# Patient Record
Sex: FEMALE | Race: WHITE | URBAN - METROPOLITAN AREA
[De-identification: names, ages, dates, MRNs, and addresses within clinical notes are randomized per-mention and may not be internally consistent; named-entity substitution may affect disease eponyms.]

---

## 2024-02-09 ENCOUNTER — APPOINTMENT (RX ONLY)
Dept: URBAN - METROPOLITAN AREA CLINIC 41 | Facility: CLINIC | Age: 34
Setting detail: DERMATOLOGY
End: 2024-02-09

## 2024-02-09 DIAGNOSIS — D22 MELANOCYTIC NEVI: ICD-10-CM | Status: STABLE

## 2024-02-09 DIAGNOSIS — Z87.2 PERSONAL HISTORY OF DISEASES OF THE SKIN AND SUBCUTANEOUS TISSUE: ICD-10-CM | Status: STABLE

## 2024-02-09 DIAGNOSIS — L57.8 OTHER SKIN CHANGES DUE TO CHRONIC EXPOSURE TO NONIONIZING RADIATION: ICD-10-CM | Status: STABLE

## 2024-02-09 DIAGNOSIS — Z41.9 ENCOUNTER FOR PROCEDURE FOR PURPOSES OTHER THAN REMEDYING HEALTH STATE, UNSPECIFIED: ICD-10-CM

## 2024-02-09 DIAGNOSIS — L82.1 OTHER SEBORRHEIC KERATOSIS: ICD-10-CM | Status: STABLE

## 2024-02-09 DIAGNOSIS — B07.8 OTHER VIRAL WARTS: ICD-10-CM | Status: INADEQUATELY CONTROLLED

## 2024-02-09 DIAGNOSIS — D18.0 HEMANGIOMA: ICD-10-CM | Status: STABLE

## 2024-02-09 PROBLEM — D18.01 HEMANGIOMA OF SKIN AND SUBCUTANEOUS TISSUE: Status: ACTIVE | Noted: 2024-02-09

## 2024-02-09 PROBLEM — D22.4 MELANOCYTIC NEVI OF SCALP AND NECK: Status: ACTIVE | Noted: 2024-02-09

## 2024-02-09 PROBLEM — D22.5 MELANOCYTIC NEVI OF TRUNK: Status: ACTIVE | Noted: 2024-02-09

## 2024-02-09 PROCEDURE — ? COSMETIC CONSULTATION: MICRONEEDLING

## 2024-02-09 PROCEDURE — ? COSMETIC CONSULTATION: PRODUCTS

## 2024-02-09 PROCEDURE — ? COSMETIC CONSULTATION: FACIAL

## 2024-02-09 PROCEDURE — ? FULL BODY SKIN EXAM

## 2024-02-09 PROCEDURE — ? COSMETIC CONSULTATION: GENERAL

## 2024-02-09 PROCEDURE — ? COUNSELING

## 2024-02-09 PROCEDURE — ? TREATMENT REGIMEN

## 2024-02-09 PROCEDURE — 99203 OFFICE O/P NEW LOW 30 MIN: CPT

## 2024-02-09 PROCEDURE — ? PRESCRIPTION

## 2024-02-09 RX ORDER — MOMETASONE FUROATE 1 MG/G
CREAM TOPICAL
Qty: 15 | Refills: 0 | Status: ERX | COMMUNITY
Start: 2024-02-09

## 2024-02-09 RX ADMIN — MOMETASONE FUROATE: 1 CREAM TOPICAL at 00:00

## 2024-02-09 ASSESSMENT — LOCATION DETAILED DESCRIPTION DERM
LOCATION DETAILED: RIGHT POSTERIOR SHOULDER
LOCATION DETAILED: LEFT POSTERIOR SHOULDER
LOCATION DETAILED: LEFT ANTERIOR PROXIMAL THIGH
LOCATION DETAILED: INFERIOR MID FOREHEAD
LOCATION DETAILED: RIGHT CENTRAL FRONTAL SCALP
LOCATION DETAILED: LEFT INFERIOR MEDIAL MALAR CHEEK
LOCATION DETAILED: LEFT INFERIOR MEDIAL MIDBACK
LOCATION DETAILED: LEFT INFERIOR CENTRAL MALAR CHEEK
LOCATION DETAILED: LEFT PLANTAR FOREFOOT OVERLYING 2ND METATARSAL
LOCATION DETAILED: INFERIOR THORACIC SPINE
LOCATION DETAILED: RIGHT INFERIOR CENTRAL MALAR CHEEK

## 2024-02-09 ASSESSMENT — LOCATION SIMPLE DESCRIPTION DERM
LOCATION SIMPLE: RIGHT CHEEK
LOCATION SIMPLE: RIGHT SHOULDER
LOCATION SIMPLE: LEFT THIGH
LOCATION SIMPLE: LEFT SHOULDER
LOCATION SIMPLE: UPPER BACK
LOCATION SIMPLE: LEFT LOWER BACK
LOCATION SIMPLE: RIGHT SCALP
LOCATION SIMPLE: LEFT CHEEK
LOCATION SIMPLE: INFERIOR FOREHEAD
LOCATION SIMPLE: LEFT PLANTAR SURFACE

## 2024-02-09 ASSESSMENT — LOCATION ZONE DERM
LOCATION ZONE: TRUNK
LOCATION ZONE: FACE
LOCATION ZONE: ARM
LOCATION ZONE: FACE
LOCATION ZONE: LEG
LOCATION ZONE: FEET
LOCATION ZONE: SCALP

## 2024-02-09 NOTE — PROCEDURE: COUNSELING
Detail Level: Generalized
Detail Level: Simple
Detail Level: Zone
Patient Specific Counseling (Will Not Stick From Patient To Patient): ==\\nPt admits the mole is bothersome and irritating. AF counsels pt the mole can be shaved down and it is likely pt would not lose all hair in the area as the hair follicle goes past the mole.
Detail Level: Detailed
Patient Specific Counseling (Will Not Stick From Patient To Patient): ==\\nPt states she had a spitz nevus removed within the last few years.
Patient Specific Counseling (Will Not Stick From Patient To Patient): ==\\nPt notes she has been seeing a podiatrist for treatment of multiple warts on her foot.

## 2024-02-09 NOTE — HPI: EVALUATION OF SKIN LESION(S)
Hpi Title: Evaluation of Skin Lesions
Additional History: New pt here for fbse \\nMole on scalp and abdomen \\nFHMX of melanoma, grandmother

## 2024-03-11 ENCOUNTER — APPOINTMENT (RX ONLY)
Dept: URBAN - METROPOLITAN AREA CLINIC 41 | Facility: CLINIC | Age: 34
Setting detail: DERMATOLOGY
End: 2024-03-11

## 2024-03-11 DIAGNOSIS — Z41.9 ENCOUNTER FOR PROCEDURE FOR PURPOSES OTHER THAN REMEDYING HEALTH STATE, UNSPECIFIED: ICD-10-CM

## 2024-03-11 PROCEDURE — ? HYDRAFACIAL

## 2024-03-11 PROCEDURE — ? COSMETIC CONSULTATION: PRODUCTS

## 2024-03-11 ASSESSMENT — LOCATION SIMPLE DESCRIPTION DERM
LOCATION SIMPLE: LEFT ANTERIOR NECK
LOCATION SIMPLE: CHEST
LOCATION SIMPLE: RIGHT CHEEK
LOCATION SIMPLE: LEFT CHEEK

## 2024-03-11 ASSESSMENT — LOCATION DETAILED DESCRIPTION DERM
LOCATION DETAILED: LEFT CENTRAL MALAR CHEEK
LOCATION DETAILED: LEFT INFERIOR ANTERIOR NECK
LOCATION DETAILED: UPPER STERNUM
LOCATION DETAILED: RIGHT CENTRAL MALAR CHEEK

## 2024-03-11 ASSESSMENT — LOCATION ZONE DERM
LOCATION ZONE: FACE
LOCATION ZONE: TRUNK
LOCATION ZONE: NECK

## 2024-03-11 NOTE — PROCEDURE: HYDRAFACIAL
Procedure: Fusion
Tip: Hydropeel Tip, Blue
Vacuum Pressure Low Setting (Will Not Render If Set To 0): 0
Solution Override
Tip: Hydropeel Tip, Clear
Post-Care Instructions: I reviewed with the patient in detail post-care instructions. Patient should avoid all exfoliating, acne type or retinol/retinoid products for the next 5 days post treatment.  Patient should stay away from the sun and wear sun protection until treated areas are fully healed.
Solution: Activ-4
Consent: Written consent obtained, risks reviewed including but not limited to crusting, scabbing, blistering, scarring, darker or lighter pigmentary change, bruising, and/or incomplete response.
Lymphatic Therapy: yes
Tip: Hydropeel Tip, Teal
Procedure: Extraction
Comments: Prior to starting the treatment, cleansed the face with Proscriptix Antioxidant Infused Gentle Foamy Cleanser.\\n\\nTreated neck and chest with the following: \\nsame cleanser as above. \\nDid step 1 of HF (cleanse) and step 4 (Antiox+).  Immediately following treatment, I applied Proscriptix Ultra Hydrating Serum and Firming Neck Cream. \\n\\nFor face:  Immediately following the treatment, I applied Proscriptix Ultra Hydrating Serum, Hydrating Barrier Repair Cream and Sheer Mineral 50spf Sunscreen
Procedure: Exfoliation
Price (Use Numbers Only, No Special Characters Or $): 617
Treatment Number: 1
Procedure: Peel
Solution: GlySal 15%
Solution: Beta-HD
Indication: skin texture
Location: face
Solution Override: Antiox+

## 2024-03-22 ENCOUNTER — APPOINTMENT (RX ONLY)
Dept: URBAN - METROPOLITAN AREA CLINIC 41 | Facility: CLINIC | Age: 34
Setting detail: DERMATOLOGY
End: 2024-03-22

## 2024-03-22 DIAGNOSIS — Z41.9 ENCOUNTER FOR PROCEDURE FOR PURPOSES OTHER THAN REMEDYING HEALTH STATE, UNSPECIFIED: ICD-10-CM

## 2024-03-22 PROCEDURE — ? MICRONEEDLING

## 2024-03-22 ASSESSMENT — LOCATION ZONE DERM
LOCATION ZONE: FACE
LOCATION ZONE: NECK

## 2024-03-22 ASSESSMENT — LOCATION DETAILED DESCRIPTION DERM
LOCATION DETAILED: LEFT SUPERIOR LATERAL NECK
LOCATION DETAILED: LEFT INFERIOR CENTRAL MALAR CHEEK

## 2024-03-22 ASSESSMENT — LOCATION SIMPLE DESCRIPTION DERM
LOCATION SIMPLE: LEFT CHEEK
LOCATION SIMPLE: LEFT ANTERIOR NECK

## 2024-03-22 NOTE — PROCEDURE: MICRONEEDLING
Depth In Mm (Location #5): 0.1
Depth In Mm (Location #1): 0.5
Location #2: cheeks and temples
Speed (Location #2): 3 passes all over
Location #4: neck
Speed (Location #4): 3 passes
Detail Level: Zone
Price (Use Numbers Only, No Special Characters Or $): 342
# Of Treatments In Package: 0
Infusions (Optional): PRP
Post-Care Instructions: After the procedure, take precautions agains sun exposure. Do not apply sunscreen for 12 hours after the procedure. Do not apply make-up for 12 hours after the procedure. Avoid alcohol based toners for 10-14 days. After 2-3 days patients can return to their regular skin regimen.
Depth In Mm (Location #2): 1.25
Location #3: nose and upper lip
Speed (Location #3): 2 passes
Depth In Mm (Location #4): 1
Topical Anesthesia?: BLT cream (benzocaine 30%, lidocaine 12%, tetracaine 6%)
Location #1: forehead
Consent: Written consent obtained, risks reviewed including but not limited to pain, scarring, infection and incomplete improvement.  Patient understands the procedure is cosmetic in nature and will require out of pocket payment.

## 2024-08-16 ENCOUNTER — APPOINTMENT (RX ONLY)
Dept: URBAN - METROPOLITAN AREA CLINIC 41 | Facility: CLINIC | Age: 34
Setting detail: DERMATOLOGY
End: 2024-08-16

## 2024-08-16 DIAGNOSIS — Z41.9 ENCOUNTER FOR PROCEDURE FOR PURPOSES OTHER THAN REMEDYING HEALTH STATE, UNSPECIFIED: ICD-10-CM

## 2024-08-16 PROCEDURE — ? MICRONEEDLING

## 2024-08-16 ASSESSMENT — LOCATION SIMPLE DESCRIPTION DERM: LOCATION SIMPLE: LEFT CHEEK

## 2024-08-16 ASSESSMENT — LOCATION DETAILED DESCRIPTION DERM: LOCATION DETAILED: LEFT INFERIOR CENTRAL MALAR CHEEK

## 2024-08-16 ASSESSMENT — LOCATION ZONE DERM: LOCATION ZONE: FACE

## 2024-08-16 NOTE — PROCEDURE: MICRONEEDLING
Depth In Mm (Location #5): 1.25
Length Of Topical Anesthesia Application (Optional): 20 minutes
Consent: Written consent obtained, risks reviewed including but not limited to pain, scarring, infection and incomplete improvement.  Patient understands the procedure is cosmetic in nature and will require out of pocket payment.  Discussed her history of keloids and risks associated with this type of treatment. Also discussed the importance of following our post care product kit and directions, she signed a form declining purchase of the post care kit. She has her own kit from a treatment she did in [place] a few months ago and feels like that is enough.
Speed (Location #1): 3 passes
Location #1: forehead
Depth In Mm (Location #2): 1.5
Price (Use Numbers Only, No Special Characters Or $): 500
Treatment Number (Optional): 2
Depth In Mm (Location #3): 1
Topical Anesthesia?: BLT cream (benzocaine 30%, lidocaine 12%, tetracaine 6%)
Depth In Mm (Location #6): 0.1
Post-Care Instructions: After the procedure, take precautions agains sun exposure. Do not apply sunscreen for 24 hours after the procedure. Do not apply make-up for 12 hours after the procedure. Avoid alcohol based toners for 10-14 days. After 7 days patients can return to their regular skin regimen.\\nUse Lyft for first 24 hrs, then follow Proscriptix Post Procedure Kit for 6 days.
Location #2: cheeks
Detail Level: Zone
Depth In Mm (Location #4): 0.75
How Many Cc Of Bacteriostatic 0.9% Saline Were Added?: 0
Depth In Mm (Location #1): 0.5
Location #3: nose, chin

## 2024-08-16 NOTE — HPI: COSMETIC (MICRONEEDLING)
[id number]: previous_has_had_a_previous_microneedling_treatment
When Was Your Last Treatment?: 3/22/24

## 2024-12-30 ENCOUNTER — APPOINTMENT (OUTPATIENT)
Dept: URBAN - METROPOLITAN AREA CLINIC 41 | Facility: CLINIC | Age: 34
Setting detail: DERMATOLOGY
End: 2024-12-30

## 2024-12-30 DIAGNOSIS — Z41.9 ENCOUNTER FOR PROCEDURE FOR PURPOSES OTHER THAN REMEDYING HEALTH STATE, UNSPECIFIED: ICD-10-CM

## 2024-12-30 PROCEDURE — ? MICRONEEDLING

## 2024-12-30 ASSESSMENT — LOCATION DETAILED DESCRIPTION DERM: LOCATION DETAILED: LEFT INFERIOR CENTRAL MALAR CHEEK

## 2024-12-30 ASSESSMENT — LOCATION SIMPLE DESCRIPTION DERM: LOCATION SIMPLE: LEFT CHEEK

## 2024-12-30 ASSESSMENT — LOCATION ZONE DERM: LOCATION ZONE: FACE

## 2024-12-30 NOTE — PROCEDURE: MICRONEEDLING
Price (Use Numbers Only, No Special Characters Or $): 990
Location #1: forehead, upper lip, nose, near eyes
Speed (Location #2): 3 passes
Depth In Mm (Location #2): 1.25
Post-Care Instructions: After the procedure, take precautions agains sun exposure. Do not apply sunscreen for 24 hours after the procedure. Do not apply make-up for 12 hours after the procedure. Avoid alcohol based toners for 10-14 days. After 7 days patients can return to their regular skin regimen.\\nUse Lyft for first 24 hrs, then follow Proscriptix Post Procedure Kit for 6 days.
Length Of Topical Anesthesia Application (Optional): 20 minutes
# Of Treatments In Package: 2
Location #3: chin, neck
Depth In Mm (Location #1): 0.5
Depth In Mm (Location #7): 0.1
Depth In Mm (Location #4): 1.5
How Many Cc Of Bacteriostatic 0.9% Saline Were Added?: 0
Consent: Written consent obtained, risks reviewed including but not limited to pain, scarring, infection and incomplete improvement.  Patient understands the procedure is cosmetic in nature and will require out of pocket payment.  Discussed her history of keloids and risks associated with this type of treatment. Also discussed the importance of following our post care product kit and directions, she signed a form declining purchase of the post care kit. She has her own kit from a treatment she did in [place] a few months ago and feels like that is enough.
Location #2: cheeks
Detail Level: Zone
Topical Anesthesia?: BLT cream (benzocaine 30%, lidocaine 12%, tetracaine 6%)
Treatment Number (Optional): 3
Depth In Mm (Location #3): 0.75

## 2024-12-30 NOTE — HPI: COSMETIC (MICRONEEDLING)
[id number]: previous_has_had_a_previous_microneedling_treatment
When Was Your Last Treatment?: 08/16/2024

## 2025-03-27 ENCOUNTER — APPOINTMENT (OUTPATIENT)
Dept: URBAN - METROPOLITAN AREA CLINIC 41 | Facility: CLINIC | Age: 35
Setting detail: DERMATOLOGY
End: 2025-03-27

## 2025-03-27 DIAGNOSIS — Z41.9 ENCOUNTER FOR PROCEDURE FOR PURPOSES OTHER THAN REMEDYING HEALTH STATE, UNSPECIFIED: ICD-10-CM

## 2025-03-27 PROCEDURE — ? MICRONEEDLING

## 2025-03-27 PROCEDURE — ? COSMETIC CONSULTATION: PRODUCTS

## 2025-03-27 ASSESSMENT — LOCATION SIMPLE DESCRIPTION DERM
LOCATION SIMPLE: LEFT THIGH
LOCATION SIMPLE: LEFT CHEEK
LOCATION SIMPLE: LEFT ANTERIOR NECK

## 2025-03-27 ASSESSMENT — LOCATION ZONE DERM
LOCATION ZONE: NECK
LOCATION ZONE: LEG
LOCATION ZONE: FACE

## 2025-03-27 ASSESSMENT — LOCATION DETAILED DESCRIPTION DERM
LOCATION DETAILED: LEFT ANTERIOR DISTAL THIGH
LOCATION DETAILED: LEFT INFERIOR CENTRAL MALAR CHEEK
LOCATION DETAILED: LEFT SUPERIOR ANTERIOR NECK

## 2025-08-01 ENCOUNTER — APPOINTMENT (OUTPATIENT)
Dept: URBAN - METROPOLITAN AREA CLINIC 41 | Facility: CLINIC | Age: 35
Setting detail: DERMATOLOGY
End: 2025-08-01

## 2025-08-01 DIAGNOSIS — Z41.9 ENCOUNTER FOR PROCEDURE FOR PURPOSES OTHER THAN REMEDYING HEALTH STATE, UNSPECIFIED: ICD-10-CM

## 2025-08-01 PROCEDURE — ? MICRONEEDLING

## 2025-08-01 ASSESSMENT — LOCATION ZONE DERM: LOCATION ZONE: FACE

## 2025-08-01 ASSESSMENT — LOCATION SIMPLE DESCRIPTION DERM: LOCATION SIMPLE: LEFT CHEEK

## 2025-08-01 ASSESSMENT — LOCATION DETAILED DESCRIPTION DERM: LOCATION DETAILED: LEFT INFERIOR CENTRAL MALAR CHEEK

## 2025-08-01 NOTE — PROCEDURE: MICRONEEDLING
Length Of Topical Anesthesia Application (Optional): 20 minutes
# Of Treatments In Package: 3
How Many Cc Of Bacteriostatic 0.9% Saline Were Added?: 0
Depth In Mm (Location #2): 1.25
Speed (Location #2): 3 passes
Detail Level: Zone
Post-Care Instructions: After the procedure, take precautions agains sun exposure. Do not apply sunscreen for 24 hours after the procedure. Do not apply make-up for 12 hours after the procedure. Avoid alcohol based toners for 10-14 days. After 7 days patients can return to their regular skin regimen.\\nUse Lyft for first 24 hrs, then follow the following \\n\\nPatient has some of those products at home but bought mini kit today to use that cleanser. \\n\\nCleanse with Proscriptix Antioxidant Infused Foaming Cleanser \\nApply Proscriptix Ultra Hydrating Serum\\nApply Proscriptix Hydrating Barrier Repair Cream \\nApply Proscriptix Sheer Mineral Sunscreen SPF  morning and every 2 hrs when outside \\Luis morning and night\\n\\nWait 10-14 days to restart active products
Depth In Mm (Location #7): 0.1
Treatment Number (Optional): 1
Location #3: scar on left leg
Location #2: cheeks
Consent: Written consent obtained, risks reviewed including but not limited to pain, scarring, infection and incomplete improvement.  Patient understands the procedure is cosmetic in nature and will require out of pocket payment.  Discussed her history of keloids and risks associated with this type of treatment. Also discussed the importance of following our post care product kit and directions.
Topical Anesthesia?: BLT cream (benzocaine 30%, lidocaine 12%, tetracaine 6%)
Price (Use Numbers Only, No Special Characters Or $): 500
Depth In Mm (Location #4): 1.5
Speed (Location #3): 3 passes, 2 passes with 2.0 mm
Location #1: forehead, chin, nose, temples